# Patient Record
Sex: MALE | Race: OTHER | HISPANIC OR LATINO | URBAN - METROPOLITAN AREA
[De-identification: names, ages, dates, MRNs, and addresses within clinical notes are randomized per-mention and may not be internally consistent; named-entity substitution may affect disease eponyms.]

---

## 2018-02-28 ENCOUNTER — EMERGENCY (EMERGENCY)
Facility: HOSPITAL | Age: 33
LOS: 0 days | Discharge: HOME | End: 2018-03-01
Attending: EMERGENCY MEDICINE

## 2018-02-28 VITALS
DIASTOLIC BLOOD PRESSURE: 97 MMHG | OXYGEN SATURATION: 97 % | TEMPERATURE: 98 F | SYSTOLIC BLOOD PRESSURE: 153 MMHG | RESPIRATION RATE: 20 BRPM | HEART RATE: 79 BPM

## 2018-02-28 DIAGNOSIS — J02.9 ACUTE PHARYNGITIS, UNSPECIFIED: ICD-10-CM

## 2018-02-28 RX ORDER — DEXAMETHASONE 0.5 MG/5ML
10 ELIXIR ORAL ONCE
Qty: 0 | Refills: 0 | Status: COMPLETED | OUTPATIENT
Start: 2018-02-28 | End: 2018-02-28

## 2018-03-01 RX ADMIN — Medication 10 MILLIGRAM(S): at 00:07

## 2018-03-01 RX ADMIN — Medication 1 TABLET(S): at 00:07

## 2018-03-01 NOTE — ED PROVIDER NOTE - ATTENDING CONTRIBUTION TO CARE
32yo m no pmh p/w R throat pain x 2 wks. Took zpak when sx began w/out relief. Seen in [facility] earlier today, had CT neck done and sent home no abx. Pt states he rec'd call from [facility] pta advising him to come to [facility] for ENT eval. [facility] called by res Dr. Minaya who s/w Dr. Tillman in the ED, advised that CT neck was neg and pt was told to come to [facility] only if sx worsened. Pt denies f/c, ear pain, rhinorrhea, diff swallowing or breathing, drooling, cough, cp/sob. pe: nad, ncat, perrl/eomi, tms/nares clear, op +tonsillar hypertrophy & exudates bl, uvula midline, no stridor/drooling, phonating well, no trismus, rrr nl s1s2, ctab, ext nl. a/p: tonsillitis - po abx, pmd/ent f/u.

## 2018-03-01 NOTE — ED ADULT NURSE NOTE - CHPI ED SYMPTOMS NEG
no fever/no syncope/no chills/no blurred vision/no weakness/no vomiting/no change in level of consciousness/no loss of consciousness/no nausea/no numbness

## 2018-03-01 NOTE — ED PROVIDER NOTE - OBJECTIVE STATEMENT
33 yr old male, no sig PMH, presenting with sore throat and right sided throat swelling for the last 2 weeks, completed a Z-americo with no relief, states he went to [facility] today and had a CT scan of the neck, was then told to come here for ENT eval. Does not know why. Called [facility] and spoke to Dr. Tillman in the ED, who states the CT scan of the neck was negative and unremarkable, no signs of PTA, states the patient was told only to come to ED at [facility] if symptoms worsen drastically. Patient himself denies any fevers, chills, change in phonation or inability to swallow or tolerate PO.

## 2018-03-01 NOTE — ED PROVIDER NOTE - PHYSICAL EXAMINATION
Alert, NAD, WDWN, well-appearing  PERRL, EOMI, normal pupils, no icterus, normal external ENT, pink/moist membranes, +anterior adenopathy, +right sided tonsillar exudate.  Airway intact, Lungs CTAB, no wheezing or rhonchi, normal resp effort w/o tachypnea, no retractions, speaking full sentences  CVS1S2, RRR, no m/g/r, 2+ pulses b/l, warm/well-perfused  FROM all 4 ext, no bony tenderness or obvious deformities, no LE edema, unilateral swelling or calf tenderness  Skin warm/dry, no acute rash

## 2018-03-01 NOTE — ED PROVIDER NOTE - NS ED ROS FT
Review of Systems:  	•	CONSTITUTIONAL - no fever, no diaphoresis, no chills  	•	SKIN - no rash  	•	ENT - +sore throat  	•	RESPIRATORY - no shortness of breath, no cough  	•	CARDIAC - no chest pain, no palpitations  	•	GI - no abd pain, no nausea, no vomiting, no diarrhea  	•	MUSCULOSKELETAL - no joint paint, no swelling, no redness

## 2018-03-01 NOTE — ED ADULT NURSE NOTE - LANGUAGE ASSISTANCE NEEDED
patient communicates in english also/No-Patient/Caregiver offered and refused free interpretation services.

## 2021-05-30 ENCOUNTER — EMERGENCY (EMERGENCY)
Facility: HOSPITAL | Age: 36
LOS: 0 days | Discharge: HOME | End: 2021-05-30
Attending: EMERGENCY MEDICINE | Admitting: EMERGENCY MEDICINE
Payer: COMMERCIAL

## 2021-05-30 VITALS
SYSTOLIC BLOOD PRESSURE: 200 MMHG | OXYGEN SATURATION: 97 % | TEMPERATURE: 99 F | DIASTOLIC BLOOD PRESSURE: 90 MMHG | RESPIRATION RATE: 18 BRPM | HEART RATE: 72 BPM

## 2021-05-30 DIAGNOSIS — H57.11 OCULAR PAIN, RIGHT EYE: ICD-10-CM

## 2021-05-30 DIAGNOSIS — H57.89 OTHER SPECIFIED DISORDERS OF EYE AND ADNEXA: ICD-10-CM

## 2021-05-30 DIAGNOSIS — H00.012 HORDEOLUM EXTERNUM RIGHT LOWER EYELID: ICD-10-CM

## 2021-05-30 PROCEDURE — 99283 EMERGENCY DEPT VISIT LOW MDM: CPT

## 2021-05-30 NOTE — ED PROVIDER NOTE - OBJECTIVE STATEMENT
35 yo male denies pmhx presenting with R eye pain and swelling for the past 2 days, small amount of discharge, no fever, no visual changes. denies foreign body.

## 2021-05-30 NOTE — ED PROVIDER NOTE - PHYSICAL EXAMINATION
CONSTITUTIONAL: Well-developed; well-nourished; in no acute distress.   SKIN: warm, dry  HEAD: Normocephalic; atraumatic.  EYES: PERRL, EOMI, stye on R lower eyelid, draining pus. visual acuity grossly intact. visual fields intact.   ENT: No nasal discharge; airway clear.  NECK: Supple; non tender.  CARD: S1, S2 normal; no murmurs, gallops, or rubs. Regular rate and rhythm.   RESP: No wheezes, rales or rhonchi.  ABD: soft ntnd  EXT: Normal ROM.  No clubbing, cyanosis or edema.   LYMPH: No acute cervical adenopathy.  NEURO: Alert, oriented, grossly unremarkable  PSYCH: Cooperative, appropriate.

## 2021-05-30 NOTE — ED PROVIDER NOTE - NS ED ROS FT
Constitutional:  see HPI  Head:  no headache, dizziness, loss of consciousness  Eyes: R eye pain and discharge  ENMT:  no ear pain or discharge; no hearing problems; no mouth or throat sores or lesions; no throat pain  Cardiac: no chest pain, tachycardia or palpitations  Respiratory: no cough, wheezing, shortness of breath, chest tightness, or trouble breathing  GI: no nausea, vomiting, diarrhea or abdominal pain  :  no dysuria, frequency, or burning with urination; no change in urine output  MS: no myalgias, muscle weakness, joint pain,or  injury; no joint swelling  Neuro: no weakness; no numbness or tingling; no seizure  Skin:  no rashes or color changes; no lacerations or abrasions

## 2021-05-30 NOTE — ED PROVIDER NOTE - PROGRESS NOTE DETAILS
ATTENDING NOTE:   35 yo M here for R eye discomfort. Says he saw some pus come out of lower eyelid this morning. No other complaints.  ON EXAM: Stye to lower, inner R eyelid. No conjunctival injection. FROM of eye. PERRL. Vision grossly normal.  I&P: Stye that expressed pus. Warm compresses and d/c home.

## 2021-05-30 NOTE — ED PROVIDER NOTE - NSFOLLOWUPINSTRUCTIONS_ED_ALL_ED_FT
Stye  A stye, also known as a hordeolum, is a bump that forms on an eyelid. It may look like a pimple next to the eyelash. A stye can form inside the eyelid (internal stye) or outside the eyelid (external stye). A stye can cause redness, swelling, and pain on the eyelid.    Styes are very common. Anyone can get them at any age. They usually occur in just one eye, but you may have more than one in either eye.    What are the causes?  A stye is caused by an infection. The infection is almost always caused by bacteria called Staphylococcus aureus. This is a common type of bacteria that lives on the skin.    An internal stye may result from an infected oil-producing gland inside the eyelid. An external stye may be caused by an infection at the base of the eyelash (hair follicle).    What increases the risk?  You are more likely to develop a stye if:  You have had a stye before.  You have any of these conditions:  Diabetes.  Red, itchy, inflamed eyelids (blepharitis).  A skin condition such as seborrheic dermatitis or rosacea.  High fat levels in your blood (lipids).  What are the signs or symptoms?  The most common symptom of a stye is eyelid pain. Internal styes are more painful than external styes. Other symptoms may include:  Painful swelling of your eyelid.  A scratchy feeling in your eye.  Tearing and redness of your eye.  Pus draining from the stye.  How is this diagnosed?  Your health care provider may be able to diagnose a stye just by examining your eye. The health care provider may also check to make sure:  You do not have a fever or other signs of a more serious infection.  The infection has not spread to other parts of your eye or areas around your eye.  How is this treated?  Most styes will clear up in a few days without treatment or with warm compresses applied to the area. You may need to use antibiotic drops or ointment to treat an infection.    In some cases, if your stye does not heal with routine treatment, your health care provider may drain pus from the stye using a thin blade or needle. This may be done if the stye is large, causing a lot of pain, or affecting your vision.    Follow these instructions at home:  Take over-the-counter and prescription medicines only as told by your health care provider. This includes eye drops or ointments.  If you were prescribed an antibiotic medicine, apply or use it as told by your health care provider. Do not stop using the antibiotic even if your condition improves.  Apply a warm, wet cloth (warm compress) to your eye for 5–10 minutes, 4 times a day.  Clean the affected eyelid as directed by your health care provider.  Do not wear contact lenses or eye makeup until your stye has healed.  Do not try to pop or drain the stye.   Do not rub your eye.  Contact a health care provider if:  You have chills or a fever.  Your stye does not go away after several days.  Your stye affects your vision.  Your eyeball becomes swollen, red, or painful.  Get help right away if:  You have pain when moving your eye around.  Summary  A stye is a bump that forms on an eyelid. It may look like a pimple next to the eyelash.  A stye can form inside the eyelid (internal stye) or outside the eyelid (external stye). A stye can cause redness, swelling, and pain on the eyelid.  Your health care provider may be able to diagnose a stye just by examining your eye.  Apply a warm, wet cloth (warm compress) to your eye for 5–10 minutes, 4 times a day.  This information is not intended to replace advice given to you by your health care provider. Make sure you discuss any questions you have with your health care provider.

## 2021-05-30 NOTE — ED PROVIDER NOTE - PATIENT PORTAL LINK FT
You can access the FollowMyHealth Patient Portal offered by [facility] by registering at the following website: [email or web address]. By joining [employer]’s FollowMyHealth portal, you will also be able to view your health information using other applications (apps) compatible with our system.

## 2021-05-30 NOTE — ED PROVIDER NOTE - NSFOLLOWUPCLINICS_GEN_ALL_ED_FT
[facility]  Ophthalmolgy  [address], [address]  [place] [address]  Phone: [phone]  Fax:   Follow Up Time: 1-3 Days

## 2022-08-24 NOTE — ED PROVIDER NOTE - HIV OFFER
Opt out Histology Selection Override (Optional- Will Default To Parent Diagnosis If N/A): Infiltrative Basal Cell Carcinoma

## 2023-02-27 NOTE — ED ADULT NURSE NOTE - RESPIRATORY WDL
4
Breathing spontaneous and unlabored. Breath sounds clear and equal bilaterally with regular rhythm.

## 2023-12-03 NOTE — ED ADULT NURSE NOTE - MODE OF DISCHARGE
Use Tylenol or ibuprofen for pain.  Drink plenty of fluids.  You can try taking oral magnesium to help prevent menstrual associated headaches.  Follow-up with your regular doctor when you are able.  Return to the ER with new or worsening symptoms.  
Ambulatory